# Patient Record
Sex: MALE | Race: BLACK OR AFRICAN AMERICAN | NOT HISPANIC OR LATINO | Employment: PART TIME | URBAN - METROPOLITAN AREA
[De-identification: names, ages, dates, MRNs, and addresses within clinical notes are randomized per-mention and may not be internally consistent; named-entity substitution may affect disease eponyms.]

---

## 2017-07-23 ENCOUNTER — HOSPITAL ENCOUNTER (EMERGENCY)
Facility: OTHER | Age: 27
Discharge: HOME | End: 2017-07-23
Attending: EMERGENCY MEDICINE
Payer: MEDICAID

## 2017-07-23 VITALS
HEART RATE: 70 BPM | HEIGHT: 67 IN | WEIGHT: 170 LBS | OXYGEN SATURATION: 99 % | TEMPERATURE: 99 F | DIASTOLIC BLOOD PRESSURE: 80 MMHG | BODY MASS INDEX: 26.68 KG/M2 | RESPIRATION RATE: 18 BRPM | SYSTOLIC BLOOD PRESSURE: 156 MMHG

## 2017-07-23 DIAGNOSIS — R19.7 DIARRHEA, UNSPECIFIED TYPE: Primary | ICD-10-CM

## 2017-07-23 PROCEDURE — 99283 EMERGENCY DEPT VISIT LOW MDM: CPT

## 2017-07-23 PROCEDURE — 25000003 PHARM REV CODE 250: Performed by: PHYSICIAN ASSISTANT

## 2017-07-23 RX ORDER — DICYCLOMINE HYDROCHLORIDE 20 MG/1
20 TABLET ORAL 2 TIMES DAILY
Qty: 20 TABLET | Refills: 0 | Status: SHIPPED | OUTPATIENT
Start: 2017-07-23 | End: 2017-08-22

## 2017-07-23 RX ADMIN — LIDOCAINE HYDROCHLORIDE: 20 SOLUTION ORAL; TOPICAL at 09:07

## 2017-07-24 NOTE — ED PROVIDER NOTES
Encounter Date: 7/23/2017       History     Chief Complaint   Patient presents with    Diarrhea     since last night has not taken anything for it     Patient is a 27-year-old male presenting to the emergency department with complaints of diarrhea.  The patient reports his symptoms started yesterday evening.  He states he is unable to quantify, times he has had diarrhea since.  He reports it is nonbloody in nature.  He denies associated nausea or vomiting.  He does report some generalized abdominal pain most significant in the epigastric region. He denies known sick contacts.  He reports he has had grits and water today. He denies previous episode, he denies taking any medication for his symptoms thus far.      The history is provided by the patient.     Review of patient's allergies indicates:  No Known Allergies  History reviewed. No pertinent past medical history.  History reviewed. No pertinent surgical history.  History reviewed. No pertinent family history.  Social History   Substance Use Topics    Smoking status: Never Smoker    Smokeless tobacco: Never Used    Alcohol use Yes      Comment: occasionally     Review of Systems   Constitutional: Negative for activity change, chills, fatigue and fever.   HENT: Negative for congestion, rhinorrhea and sore throat.    Eyes: Negative for photophobia and visual disturbance.   Respiratory: Negative for cough and shortness of breath.    Cardiovascular: Negative for chest pain.   Gastrointestinal: Positive for abdominal pain and diarrhea. Negative for nausea and vomiting.   Genitourinary: Negative for dysuria, hematuria and urgency.   Musculoskeletal: Negative for back pain, myalgias and neck pain.   Skin: Negative for color change and wound.   Neurological: Negative for weakness and headaches.   Psychiatric/Behavioral: Negative for agitation and confusion.       Physical Exam     Initial Vitals [07/23/17 2038]   BP Pulse Resp Temp SpO2   (!) 156/80 70 18 99 °F (37.2  °C) 99 %      MAP       105.33         Physical Exam    Nursing note and vitals reviewed.  Constitutional: He appears well-developed and well-nourished. He is not diaphoretic. He is cooperative.  Non-toxic appearance. He does not have a sickly appearance. He does not appear ill. No distress.   Well appearing, -American male, unaccompanied in the emergency department.  He is smiling on exam, is in no acute distress.  He ambulates without difficulty.    HENT:   Head: Normocephalic and atraumatic.   Right Ear: External ear normal.   Left Ear: External ear normal.   Nose: Nose normal.   Mouth/Throat: Oropharynx is clear and moist.   Eyes: Conjunctivae and EOM are normal.   Neck: Normal range of motion. Neck supple.   Cardiovascular: Normal rate, regular rhythm and normal heart sounds.   Pulmonary/Chest: Breath sounds normal. No respiratory distress. He has no wheezes.   Abdominal: Soft. Bowel sounds are normal. He exhibits no distension. There is no tenderness. There is no rebound and no guarding.   Musculoskeletal: Normal range of motion.   Neurological: He is alert and oriented to person, place, and time. GCS eye subscore is 4. GCS verbal subscore is 5. GCS motor subscore is 6.   Skin: Skin is warm.   Psychiatric: He has a normal mood and affect. His behavior is normal. Judgment and thought content normal.         ED Course   Procedures  Labs Reviewed - No data to display          Medical Decision Making:   Initial Assessment:   Urgent evaluation of a 27-year-old male presenting to the emergency department with complaints of diarrhea.  Patient is afebrile, nontoxic appearing, hemodynamically stable.  Physical exam reveals regular rate and rhythm, lungs are clear to auscultation bilaterally.  Abdomen is soft and nontender with normal bowel sounds. Moist mucous membranes.  ED Management:  At this time, I do not feel that further testing or imaging is warranted.  The patient does not have an acute abdomen.  He has  normal vital signs.  He is tolerating by mouth without difficulty.  He is given a GI cocktail in the emergency department.  He will be discharged home with a prescription for Bentyl and counseled on symptomatic care and treatment including a bland diet. The patient was instructed to follow up with a primary care provider in 2 days or to return to the emergency department for worsening symptoms. The treatment plan was discussed with the patient who demonstrated understanding and comfort with plan. The patient's history, physical exam, and plan of care was discussed with and agreed upon with my supervising physician.    Other:   I have discussed this case with another health care provider.       <> Summary of the Discussion: Dr. Sahni  This note was created using Dragon Medical Dictation. There may be typographical errors secondary to dictation.                    ED Course     Clinical Impression:     1. Diarrhea, unspecified type         Disposition:   Disposition: Discharged  Condition: Stable                        Ritu Lamb PA-C  07/23/17 8835

## 2017-07-24 NOTE — ED NOTES
Diarrhea with intermittent epigastric pain since yesterday at 2000. Unable to eat anything related nausea and abdominal pain. Denies vomiting, blood in stool, or SOB. Pain to epigastric region rated 8/10 pain. Pt AAOx4 and appropriate at this time. Respirations even and unlabored. No acute distress noted.

## 2018-08-08 ENCOUNTER — HOSPITAL ENCOUNTER (EMERGENCY)
Dept: HOSPITAL 62 - ER | Age: 28
Discharge: HOME | End: 2018-08-08
Payer: COMMERCIAL

## 2018-08-08 VITALS — DIASTOLIC BLOOD PRESSURE: 86 MMHG | SYSTOLIC BLOOD PRESSURE: 146 MMHG
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
## 2018-08-08 DIAGNOSIS — M79.89: ICD-10-CM
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
## 2018-08-08 DIAGNOSIS — R20.0: ICD-10-CM

## 2018-08-08 DIAGNOSIS — T75.4XXA: Primary | ICD-10-CM
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
## 2018-08-08 PROCEDURE — 93005 ELECTROCARDIOGRAM TRACING: CPT

## 2018-08-08 PROCEDURE — 93010 ELECTROCARDIOGRAM REPORT: CPT

## 2018-08-08 PROCEDURE — 96372 THER/PROPH/DIAG INJ SC/IM: CPT

## 2018-08-08 PROCEDURE — 99284 EMERGENCY DEPT VISIT MOD MDM: CPT

## 2018-08-08 NOTE — ER DOCUMENT REPORT
ED General





- General


Chief Complaint: Electrical Burn


Stated Complaint: ELECTRIC SHOCK TO HAND


Time Seen by Provider: 08/08/18 19:49


Notes: 





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 

 when he shocked himself with his left hand.  He stated that he had 

minor swelling on the back of his left hand approximately posterior to thenar 

eminence.  Patient has numbness in this area as well but has full flexion of 

extension of ab and abduction.  No known medical problems does not take 

medications a daily basis recently moved to the area does not have a primary 

care physician


TRAVEL OUTSIDE OF THE [place] IN LAST 30 DAYS: No





Past Medical History





- Social History


Smoking Status: Unknown if Ever Smoked


Family History: Reviewed & Not Pertinent





Review of Systems





- Review of Systems


Constitutional: No symptoms reported


EENT: No symptoms reported


Cardiovascular: No symptoms reported


Respiratory: No symptoms reported


Gastrointestinal: No symptoms reported


Genitourinary: No symptoms reported


Male Genitourinary: No symptoms reported


Musculoskeletal: See HPI


Skin: No symptoms reported


Hematologic/Lymphatic: No symptoms reported


Neurological/Psychological: No symptoms reported





Physical Exam





- Vital signs


Vitals: 


 











Temp Pulse Resp BP Pulse Ox


 


 98.4 F   68   18   146/86 H  98 


 


 08/08/18 19:33  08/08/18 19:33  08/08/18 19:33  08/08/18 19:33  08/08/18 19:33














- Cardiovascular


Rhythm: Regular


Heart sounds: Normal auscultation


Murmur: No





- Extremities


General upper extremity: Other - Dorsal aspect of left hand has small papule 

that is directly inferior to thenar eminence region and also has small 

discoloration over dorsal aspect over the left wrist.  No bogginess no skin 

discoloration elsewhere full flexion and extension ab and abduction of thumb 

but pain with doing so.  No signs of compartment syndrome.





Course





- Re-evaluation


Re-evalutation: 





08/08/18 20:25


Discussed case with Dr. Corona who will see patient for evaluation in the 

morning tomorrow.  No signs of compartment syndrome no signs of tendon 

involvement at this time Dr. Corona said he may consider MRI depending on exam.

  Patient is right-hand dominant





- Vital Signs


Vital signs: 


 











Temp Pulse Resp BP Pulse Ox


 


 98.4 F   68   18   146/86 H  98 


 


 08/08/18 19:33  08/08/18 19:33  08/08/18 19:33  08/08/18 19:33  08/08/18 19:33














- EKG Interpretation by Me


EKG shows normal: Sinus rhythm


Rate: Normal


Rhythm: NSR - normal axis





Discharge





- Discharge


Condition: Good


Disposition: HOME, SELF-CARE


Instructions:  Electrical Injury ([facility])


Prescriptions: 


Naproxen 500 mg PO BID #20 tablet


Referrals: 


LARRY CORONA MD [ACTIVE STAFF] - Follow up tomorrow

## 2020-02-14 ENCOUNTER — HOSPITAL ENCOUNTER (EMERGENCY)
Dept: HOSPITAL 62 - ER | Age: 30
Discharge: HOME | End: 2020-02-14
Payer: COMMERCIAL

## 2020-02-14 VITALS — DIASTOLIC BLOOD PRESSURE: 95 MMHG | SYSTOLIC BLOOD PRESSURE: 165 MMHG

## 2020-02-14 DIAGNOSIS — F17.200: ICD-10-CM

## 2020-02-14 DIAGNOSIS — H92.01: ICD-10-CM

## 2020-02-14 DIAGNOSIS — H60.501: Primary | ICD-10-CM

## 2020-02-14 PROCEDURE — 99282 EMERGENCY DEPT VISIT SF MDM: CPT

## 2020-02-14 NOTE — ER DOCUMENT REPORT
HPI





- HPI


Time Seen by Provider: 02/14/20 15:07


Notes: 





Patient is a 30-year-old male no significant past medical history presents 

complaint of right ear pain that began last night.  Patient does use Q-tips.  He

has been otherwise able to eat and drink without difficulty.  He is urinating 

normally and having normal bowel movements.  Denies drug allergies.  No other 

concerns or complaints.  No recent illness.  Denies any headache, fever, neck 

pain, changes in vision/speech/mentation/hearing, URI, sore throat, chest pain, 

palpitations, syncope, cough, shortness of breath, wheeze, dyspnea, abdominal 

pain, nausea/vomiting/diarrhea, urinary retention, dysuria, hematuria, tinnitus,

dizziness, or rash.





- ROS


Systems Reviewed and Negative: Yes All other systems reviewed and negative





Past Medical History





- Social History


Smoking Status: Current Every Day Smoker


Family History: Reviewed & Not Pertinent


Renal/ Medical History: Denies: Hx Peritoneal Dialysis





Vertical Provider Document





- CONSTITUTIONAL


Agree With Documented VS: Yes


Notes: 





PHYSICAL EXAMINATION:





GENERAL: Well-appearing, well-nourished and in no acute distress.  A&Ox4.  

Answers questions appropriately.  Moves comfortably w/o notable distress





HEAD: Atraumatic, normocephalic.





EYES: Pupils equal round and reactive to light, extraocular movements intact, 

sclera anicteric, conjunctiva are normal.





ENT: Rt EAC tender, scant discharge w/o significant swelling/occlusion.  Lt EAC 

wnl. + Tenderness to tragus rt.  No mastoid tenderness bilaterally.  TM's intact

b/l without erythema, fluid, or perforation.  Nares patent and without 

discharge.  oropharynx no erythema without exudates.  No tonsilar hypertrophy 

without erythema or exudate.  No palatine shift.  Uvula midline.  No tongue 

protrusion.  No drooling, hoarseness, or airway compromise.  Moist mucous 

membranes.  No sinus tenderness.





NECK: Normal range of motion, supple without lymphadenopathy.  No 

rigidity/meningismus.





LUNGS: Breath sounds clear to auscultation bilaterally and equal.  No wheezes 

rales or rhonchi.  No retractions





HEART: Regular rate and rhythm without murmurs, rubs, gallops.





NEUROLOGICAL: Normal speech, normal gait.  





PSYCH: Normal mood, normal affect.





SKIN: Warm, Dry, normal turgor, no rashes or lesions noted.





- INFECTION CONTROL


TRAVEL OUTSIDE OF THE [place] IN LAST 30 DAYS: No





Course





- Re-evaluation


Re-evalutation: 





02/14/20 15:13


Patient is an afebrile, well-hydrated, 30-year-old male who presents with acute 

otitis externa of the right ear.  Vitals are acceptable without significant 

tachycardia, tachypnea, or hypoxia.  PE is otherwise unremarkable.  Patient is 

nontoxic-appearing and is tolerating p.o. without difficulty.  No ear wick 

warranted at this time.  No further work-up warranted.  Low suspicion for any 

sepsis, meningitis, severe dehydration, respiratory compromise, mastoiditis, or 

other systemic emergent condition at this time.  Patient is aware that condition

can change from initial presentation and he needs to monitor symptoms closely 

and seek medical attention with any acute changes. Rx for ciprodex. Recheck with

your PCM in 3 to 5 days.  Consider consult with ENT.  Return to the ED with any 

other worsening/concerning symptoms.  Patient is in agreement.





- Vital Signs


Vital signs: 


                                        











Temp Pulse Resp BP Pulse Ox


 


 98.5 F   69   18   165/95 H  99 


 


 02/14/20 14:59  02/14/20 14:59  02/14/20 14:59  02/14/20 14:59  02/14/20 14:59














Discharge





- Discharge


Clinical Impression: 


 Right ear pain





Acute otitis externa of right ear


Qualifiers:


 Otitis externa type: unspecified type Qualified Code(s): H60.501 - Unspecified 

acute noninfective otitis externa, right ear





Condition: Stable


Disposition: HOME, SELF-CARE


Instructions:  Otitis Externa ([facility])


Additional Instructions: 


Maintain adequate fluid intake


Take meds as directed


tylenol/ibuprofen as needed


Avoid Q-tips in the ears


over the counter cold medication as needed for symptoms


F/u:  with your PCM in 3-5 days for a recheck


Consider consult with ENT





Return to the ED with any fever, dizziness, tinnitus, headaches, worsening pain,

chest pain, palpitations, syncope, neck pain/stiffness, shortness of breath, 

wheezing, drooling, trouble swallowing/breathing, abdominal pain, n/v/d, rash, 

or worsening/concerning symptoms otherwise.


Prescriptions: 


Ciprofloxacin HCl/Dexameth [Ciprodex Otic Suspension 7.5 ml Bottle] 4 drop OT 

BID #1 bottle


Forms:  Elevated Blood Pressure, Smoking Cessation Education


Referrals: 


JOSE DODSON DO [ASSOCIATE] - Follow up as needed

## 2020-02-26 ENCOUNTER — HOSPITAL ENCOUNTER (EMERGENCY)
Dept: HOSPITAL 62 - ER | Age: 30
Discharge: HOME | End: 2020-02-26
Payer: COMMERCIAL

## 2020-02-26 VITALS — SYSTOLIC BLOOD PRESSURE: 126 MMHG | DIASTOLIC BLOOD PRESSURE: 80 MMHG

## 2020-02-26 DIAGNOSIS — R51: Primary | ICD-10-CM

## 2020-02-26 DIAGNOSIS — H53.8: ICD-10-CM

## 2020-02-26 DIAGNOSIS — F17.200: ICD-10-CM

## 2020-02-26 LAB
ADD MANUAL DIFF: NO
ALBUMIN SERPL-MCNC: 3.7 G/DL (ref 3.5–5)
ALP SERPL-CCNC: 59 U/L (ref 38–126)
ANION GAP SERPL CALC-SCNC: 8 MMOL/L (ref 5–19)
APPEARANCE ALL TUBES: (no result)
APPEARANCE ALL TUBES: (no result)
AST SERPL-CCNC: 25 U/L (ref 17–59)
BASOPHILS # BLD AUTO: 0.1 10^3/UL (ref 0–0.2)
BASOPHILS NFR BLD AUTO: 0.8 % (ref 0–2)
BILIRUB DIRECT SERPL-MCNC: 0.2 MG/DL (ref 0–0.4)
BILIRUB SERPL-MCNC: 0.3 MG/DL (ref 0.2–1.3)
BUN SERPL-MCNC: 11 MG/DL (ref 7–20)
CALCIUM: 9.3 MG/DL (ref 8.4–10.2)
CHLORIDE SERPL-SCNC: 105 MMOL/L (ref 98–107)
CO2 SERPL-SCNC: 27 MMOL/L (ref 22–30)
COLOR TUBE 1: (no result)
COLOR TUBE 1: (no result)
COLOR TUBE 2: (no result)
COLOR TUBE 2: (no result)
COLOR TUBE 3: (no result)
COLOR TUBE 3: (no result)
COLOR TUBE 4: (no result)
COLOR TUBE 4: (no result)
CSF TOTAL VOLUME: 5 CC
CSF TOTAL VOLUME: 5 CC
CSF TUBE NUMBER: 1
CSF TUBE NUMBER: 4
EOSINOPHIL # BLD AUTO: 0 10^3/UL (ref 0–0.6)
EOSINOPHIL NFR BLD AUTO: 0.2 % (ref 0–6)
ERYTHROCYTE [DISTWIDTH] IN BLOOD BY AUTOMATED COUNT: 13.4 % (ref 11.5–14)
GLUCOSE CSF-MCNC: 67 MG/DL (ref 40–70)
GLUCOSE SERPL-MCNC: 110 MG/DL (ref 75–110)
HCT VFR BLD CALC: 43 % (ref 37.9–51)
HGB BLD-MCNC: 15 G/DL (ref 13.5–17)
LYMPHOCYTES # BLD AUTO: 1.5 10^3/UL (ref 0.5–4.7)
LYMPHOCYTES NFR BLD AUTO: 20.4 % (ref 13–45)
MCH RBC QN AUTO: 28.3 PG (ref 27–33.4)
MCHC RBC AUTO-ENTMCNC: 34.9 G/DL (ref 32–36)
MCV RBC AUTO: 81 FL (ref 80–97)
MONOCYTES # BLD AUTO: 0.4 10^3/UL (ref 0.1–1.4)
MONOCYTES NFR BLD AUTO: 4.8 % (ref 3–13)
MONONUCLEAR CELLS CSF: 17 %
NEUTROPHILS # BLD AUTO: 5.6 10^3/UL (ref 1.7–8.2)
NEUTS SEG NFR BLD AUTO: 73.8 % (ref 42–78)
PLATELET # BLD: 167 10^3/UL (ref 150–450)
POLYMORPHONUCLEAR CELLS CSF: 83 %
POTASSIUM SERPL-SCNC: 4.1 MMOL/L (ref 3.6–5)
PROT SERPL-MCNC: 6.4 G/DL (ref 6.3–8.2)
PROTEIN,CSF: 50 MG/DL (ref 12–60)
RBC # BLD AUTO: 5.31 10^6/UL (ref 4.35–5.55)
TOTAL CELLS COUNTED % (AUTO): 100 %
VOLUME TUBE 1: 1 CC
VOLUME TUBE 1: 1 CC
VOLUME TUBE 2: 1 CC
VOLUME TUBE 2: 1 CC
VOLUME TUBE 3: 1 CC
VOLUME TUBE 3: 1 CC
VOLUME TUBE 4: 2 CC
VOLUME TUBE 4: 2 CC
WBC # BLD AUTO: 7.6 10^3/UL (ref 4–10.5)

## 2020-02-26 PROCEDURE — 99284 EMERGENCY DEPT VISIT MOD MDM: CPT

## 2020-02-26 PROCEDURE — 87205 SMEAR GRAM STAIN: CPT

## 2020-02-26 PROCEDURE — 36415 COLL VENOUS BLD VENIPUNCTURE: CPT

## 2020-02-26 PROCEDURE — 89050 BODY FLUID CELL COUNT: CPT

## 2020-02-26 PROCEDURE — 70498 CT ANGIOGRAPHY NECK: CPT

## 2020-02-26 PROCEDURE — 84157 ASSAY OF PROTEIN OTHER: CPT

## 2020-02-26 PROCEDURE — 70450 CT HEAD/BRAIN W/O DYE: CPT

## 2020-02-26 PROCEDURE — 80053 COMPREHEN METABOLIC PANEL: CPT

## 2020-02-26 PROCEDURE — 87070 CULTURE OTHR SPECIMN AEROBIC: CPT

## 2020-02-26 PROCEDURE — 00JU3ZZ INSPECTION OF SPINAL CANAL, PERCUTANEOUS APPROACH: ICD-10-PCS | Performed by: EMERGENCY MEDICINE

## 2020-02-26 PROCEDURE — 82945 GLUCOSE OTHER FLUID: CPT

## 2020-02-26 PROCEDURE — 70496 CT ANGIOGRAPHY HEAD: CPT

## 2020-02-26 PROCEDURE — 85025 COMPLETE CBC W/AUTO DIFF WBC: CPT

## 2020-02-26 NOTE — RADIOLOGY REPORT (SQ)
EXAM DESCRIPTION:  CTA HEAD



COMPLETED DATE/TIME:  2/26/2020 2:13 pm



REASON FOR STUDY:  right sided headache



COMPARISON:  None.



TECHNIQUE:  Post IV contrast scanning, thin section axial imaging through the brain to evaluate the a
rterial structures.  Source and MIP images are saved and reviewed on PACS.

Advanced 3D imaging as volume-rendering, MIPs, SSD performed? yes

All CT scanners at this facility use dose modulation, iterative reconstruction, and/or weight based d
osing when appropriate to reduce radiation dose to as low as reasonably achievable (ALARA).

[facility]: Dose Right  [facility]: CareDose    [facility]: Dose Right    [facility]: Teradose 4D    [facility]: Smart Technologies



CONTRAST TYPE AND DOSE:  75 mL Omnipaque 350



RENAL FUNCTION:  BUN 11, creatinine 0.79



LIMITATIONS:  None.



FINDINGS:  [place]: The anterior, middle, posterior cerebral arteries are all patent.  No ev
idence of aneurysm or focal stenosis.

POSTERIOR CIRCULATION: The distal vertebral arteries are patent as is the basilar artery. No aneurysm
.

BRAIN: No gross enhancing lesions as visualized.  The superior cerebral hemispheres are not included 
in the field of view.

BONES: Intact as visualized.

SINUSES: No fluid or mucosal thickening.

OTHER: No other significant finding.



IMPRESSION:  NO CTA EVIDENCE OF STENOSIS OR ANEURYSM OF THE [place].



TECHNICAL DOCUMENTATION:  JOB ID:  [id number]

Quality ID # 436: Final reports with documentation of one or more dose reduction techniques (e.g., Au
tomated exposure control, adjustment of the mA and/or kV according to patient size, use of iterative 
reconstruction technique)

 2011 [employer]- All Rights Reserved



Reading location - IP/workstation name: [email or web address]

## 2020-02-26 NOTE — RADIOLOGY REPORT (SQ)
EXAM DESCRIPTION:  CTA NECK



COMPLETED DATE/TIME:  2/26/2020 2:14 pm



REASON FOR STUDY:  right sided headache



COMPARISON:  None.



TECHNIQUE:  Axial dynamic scanning technique with  dynamic contrast enhancement through the extra-cra
nial carotid and vertebral  arteries.  Multiplanar reconstruction.  3-D MIPS and Volume-rendered imag
es  acquired at the workstation and saved to PACS.  Images are reviewed in soft  tissue, bone, lung w
indows.

All CT scanners at this facility use dose modulation, iterative reconstruction, and/or weight based d
osing when appropriate to reduce radiation dose to as low as reasonably achievable (ALARA).

[facility]: Dose Right  [facility]: CareDose    [facility]: Dose Right    [facility]: Teradose 4D    [facility]: [employer]



CONTRAST TYPE AND DOSE:  75 mL Omnipaque 350



RENAL FUNCTION:  BUN 11, creatinine 0.79



LIMITATIONS:  None.



FINDINGS:  AORTIC ARCH: Normal three-vessel origin.  Bilateral subclavian arteries are patent.  No  d
issection.

RIGHT CAROTIDS: Patent common, internal and external carotid arteries without suggestion of significa
nt stenosis or irregular plaque.  No dissection.

RIGHT VERTEBRAL: Patent.  No dissection.

LEFT CAROTIDS: Patent common, internal and external carotid arteries without suggestion of significan
t stenosis or irregular plaque.  No dissection.

LEFT VERTEBRAL: Patent.  No dissection.

OTHER: No other significant finding.

OTHER: 3-D  reconstructions confirm findings.



IMPRESSION:  NORMAL CTA OF THE EXTRA-CRANIAL CAROTID AND VERTEBRAL ARTERIES.



COMMENT:  Quality ID #195: Measurements of distal internal carotid diameter were used as the denomina
tor for stenosis measurement.



TECHNICAL DOCUMENTATION:  JOB ID:  [id number]

Quality ID # 436: Final reports with documentation of one or more dose reduction techniques (e.g., Au
tomated exposure control, adjustment of the mA and/or kV according to patient size, use of iterative 
reconstruction technique)

 2011 [employer]- All Rights Reserved



Reading location - IP/workstation name: [email or web address]

## 2020-02-26 NOTE — ER DOCUMENT REPORT
ED General





- General


Chief Complaint: Headache, Worst Ever


Stated Complaint: HEADACHE,FACIAL PAIN


Time Seen by Provider: 02/26/20 06:27


Primary Care Provider: 


[facility] [Provider Group] - Follow up as needed


Mode of Arrival: Ambulatory


Information source: Patient


TRAVEL OUTSIDE OF THE [place] IN LAST 30 DAYS: No





- HPI


Notes: 





Patient states that last night he started to have a severe headache on the right

side of his head.  States it started suddenly.  States he goes from the right 

temple to the right ear.  He denies any sinus congestion cough or cold.  No 

rashes.  No trauma.  He states that his vision has been slightly blurry.  No 

trouble swallowing or speaking.  He states he has had no nausea or vomiting.  He

states the pain is severe and throbbing.  Is located only on the right side of 

his head and radiates from the top to the ear.  It is constant.  Nothing makes 

it better or worse.





- Related Data


Allergies/Adverse Reactions: 


                                        





No Known Allergies Allergy (Verified 02/14/20 15:06)


   











Past Medical History





- General


Information source: Patient





- Social History


Smoking Status: Current Every Day Smoker


Chew tobacco use (# tins/day): No


Frequency of alcohol use: None


Drug Abuse: None


Family History: Reviewed & Not Pertinent


Patient has suicidal ideation: No


Patient has homicidal ideation: No


Renal/ Medical History: Denies: Hx Peritoneal Dialysis





Review of Systems





- Review of Systems


Constitutional: denies: Chills, Fever


Cardiovascular: denies: Chest pain, Palpitations


Respiratory: denies: Cough, Short of breath


-: Yes All other systems reviewed and negative





Physical Exam





- Vital signs


Vitals: 


                                        











Temp Pulse Resp BP Pulse Ox


 


 97.9 F   64   16   151/94 H  100 


 


 02/26/20 10:58  02/26/20 10:58  02/26/20 10:58  02/26/20 10:58  02/26/20 10:58











Interpretation: Normal





- General


General appearance: Appears well, Alert





- HEENT


Head: Normocephalic, Atraumatic


Eyes: Normal


Pupils: PERRL


Ears: Normal


External canal: Normal


Tympanic membrane: Normal


Mouth/Lips: Normal


Mucous membranes: Moist





- Respiratory


Respiratory status: No respiratory distress


Chest status: Nontender


Breath sounds: Normal


Chest palpation: Normal





- Cardiovascular


Rhythm: Regular


Heart sounds: Normal auscultation


Murmur: No





- Abdominal


Inspection: Normal


Distension: No distension


Bowel sounds: Normal


Tenderness: Nontender


Organomegaly: No organomegaly





- Back


Back: Normal, Nontender





- Extremities


General upper extremity: Normal inspection, Nontender, Normal color, Normal ROM,

Normal temperature


General lower extremity: Normal inspection, Nontender, Normal color, Normal ROM,

Normal temperature, Normal weight bearing.  No: Paulette's sign





- Neurological


Neuro grossly intact: Yes


Cognition: Normal


Orientation: AAOx4


Alicia Coma Scale Eye Opening: Spontaneous


Alicia Coma Scale Verbal: Oriented


Alicia Coma Scale Motor: Obeys Commands


Ambrose Coma Scale Total: 15


Speech: Normal


Cranial nerves: Normal


Cerebellar coordination: Normal


Motor strength normal: LUE, RUE, LLE, RLE


Additional motor exam normals: Equal .  No: Pronator drift


Sensory: Normal





- Psychological


Associated symptoms: Normal affect, Normal mood





- Skin


Skin Temperature: Warm


Skin Moisture: Dry


Skin Color: Normal





Course





- Re-evaluation


Re-evalutation: 





02/26/20 14:42


Patient presented with a right-sided headache that he states was worst of his 

life.  CAT scan was unremarkable.  Patient had an LP which had a tap consistent 

with a traumatic tap not with a hemorrhage.  Patient also had a CTA which showed

no evidence of any vascular irregularities.  Patient has a normal neurological 

exam.





- Vital Signs


Vital signs: 


                                        











Temp Pulse Resp BP Pulse Ox


 


 98.5 F   60   14   126/73 H  100 


 


 02/26/20 12:39  02/26/20 12:39  02/26/20 12:39  02/26/20 12:39  02/26/20 12:39














- Laboratory


Result Diagrams: 


                                 02/26/20 08:05





                                 02/26/20 08:05


Laboratory results interpreted by me: 


                                        











  02/26/20 02/26/20





  10:00 10:00


 


CSF WBC   17 H


 


CSF RBC  5300 H  28539 H














- Diagnostic Test


Radiology reviewed: Image reviewed, Reports reviewed





Procedures





- Lumbar Puncture


  ** Lumbar puncture


Time completed: 10:01


Consent obtained: Yes


Lumbar puncture pre-procedure: Sterile PPE donned, Betadine prep applied, 

Sterile drapes applied


Patient position: Lying


Needle size: 21


Lumbar puncture location: l3-l4


Anesthetic type: 1% Lidocaine


mL's of anesthetic: 5


Amount/type of drainage: 5cc's


Number of attempts: 1


Complications: Yes - bloody tap





Discharge





- Discharge


Clinical Impression: 


Headache


Qualifiers:


 Headache type: unspecified Headache chronicity pattern: acute headache 

Intractability: intractable Qualified Code(s): R51 - Headache





Condition: Stable


Disposition: HOME, SELF-CARE


Instructions:  Headache ([facility]), Post Lumbar Puncture ([facility])


Additional Instructions: 


Please call [place] [facility] as soon as possible to arrange follow-up


Prescriptions: 


Hydrocodone/Acetaminophen [Norco 5-325 mg Tablet] 1 tab PO Q6 PRN 3 Days #12 

tablet


 PRN Reason: 


Forms:  Return to Work


Referrals: 


[facility] [Provider Group] - Follow up as needed

## 2020-02-26 NOTE — RADIOLOGY REPORT (SQ)
EXAM DESCRIPTION: 



CT HEAD WITHOUT IV CONTRAST



COMPLETED DATE/TME:  02/26/2020 06:52



CLINICAL HISTORY: 



30 years, Male, right sided headache



COMPARISON:

None.



TECHNIQUE:

Axial CT images of the brain were obtained without contrast.

Sagittal and coronal reformats were performed. [facility]  Images

stored on PACS.

 

All CT scanners at this facility use dose modulation, iterative

reconstruction, and/or weight based dosing when appropriate to

reduce radiation dose to as low as reasonably achievable (ALARA).





[facility]: Dose Right [facility]: CareDose   [facility]: Dose Right    [facility]:

Teradose 4D    [facility]: [employer]



LIMITATIONS:

None.



FINDINGS:



There is no acute cortical infarct, hemorrhage, mass, edema,

hydrocephalus, or extra-axial fluid collection. The gray-white

matter differentiation is preserved. A mucus retention cyst is in

the left maxillary sinus. There is mucosal thickening of the

right maxillary sinus. The mastoid air cells are clear. There is

no acute fracture.





IMPRESSION:



No acute intracranial abnormality

 

TECHNICAL DOCUMENTATION:



Quality ID # 436: Final reports with documentation of one or more

dose reduction techniques (e.g., Automated exposure control,

adjustment of the mA and/or kV according to patient size, use of

iterative reconstruction technique)



copyright 2011 [employer]- All Rights Reserved

## 2020-03-01 ENCOUNTER — HOSPITAL ENCOUNTER (EMERGENCY)
Dept: HOSPITAL 62 - ER | Age: 30
LOS: 1 days | Discharge: HOME | End: 2020-03-02
Payer: COMMERCIAL

## 2020-03-01 DIAGNOSIS — F17.200: ICD-10-CM

## 2020-03-01 DIAGNOSIS — R51: Primary | ICD-10-CM

## 2020-03-01 PROCEDURE — 96374 THER/PROPH/DIAG INJ IV PUSH: CPT

## 2020-03-01 PROCEDURE — 96361 HYDRATE IV INFUSION ADD-ON: CPT

## 2020-03-01 PROCEDURE — 99283 EMERGENCY DEPT VISIT LOW MDM: CPT

## 2020-03-01 PROCEDURE — 96375 TX/PRO/DX INJ NEW DRUG ADDON: CPT

## 2020-03-02 VITALS — SYSTOLIC BLOOD PRESSURE: 132 MMHG | DIASTOLIC BLOOD PRESSURE: 77 MMHG

## 2020-03-02 NOTE — ER DOCUMENT REPORT
ED General





- General


Chief Complaint: Headache


Stated Complaint: HEADACHE


Time Seen by Provider: 03/02/20 02:08


TRAVEL OUTSIDE OF THE [place] IN LAST 30 DAYS: No





- HPI


Notes: 





Patient is a 30-year-old male who presents the emergency department for 

evaluation of a headache.  He was actually seen here earlier in the week for 

sudden onset right-sided headache on Wednesday.  He was seen by another ED 

provider, had CT of the head, CTA of the head and neck, lumbar puncture 

performed.  These were all found to be unremarkable.  He states his pain is 

still present.  He was sent home with Vicodin which he states helps for a short 

amount of time that his pain comes right back.  He does state that at times 

light makes his pain worse, nothing seems to make it significantly better.  He 

currently rates his pain at a 9 out of 10.  No fevers or chills.  No visual 

changes.  He is speaking and swallowing without difficulty.  No sore throats.  

No rashes.





- Related Data


Allergies/Adverse Reactions: 


                                        





No Known Allergies Allergy (Verified 02/14/20 15:06)


   








Home Medications: hydrocodone for headaches





Past Medical History





- General


Information source: Patient





- Social History


Smoking Status: Current Every Day Smoker


Family History: Reviewed & Not Pertinent


Patient has suicidal ideation: No


Patient has homicidal ideation: No


Renal/ Medical History: Denies: Hx Peritoneal Dialysis





Review of Systems





- Review of Systems


Neurological/Psychological: See HPI


-: Yes All other systems reviewed and negative





Physical Exam





- Vital signs


Vitals: 





                                        











Temp Pulse Resp BP Pulse Ox


 


 98.2 F   66   16   145/82 H  99 


 


 03/01/20 22:37  03/01/20 22:37  03/01/20 22:37  03/01/20 22:37  03/01/20 22:37














- Notes


Notes: 





Vital signs reviewed, please refer to chart. Head is normocephalic, atraumatic. 

Pupils equal round, reactive to light.  Oral mucosa is moist.  Uvula is midline.

 Neck is supple without meningismus.  He does have some tenderness at the base 

of the occiput on the right with associated spasm.  Heart is regular rate and 

rhythm.  Lungs are clear to auscultation bilaterally.  Abdomen is soft, 

nontender, normoactive bowel sounds throughout.  Extremities without cyanosis, 

clubbing. Posterior calves are nontender.  Peripheral pulses are equal.  Skin is

warm and dry.  Patient is awake, alert, oriented x3.  Cranial nerves II - XII 

are grossly intact without focal neurological deficits.  Strength is plus 5 out 

of 5 bilateral upper and lower extremities.  Sensation is intact.  Reflexes 

symmetrical.  Intact finger-nose-finger, rapid alternating movements, 

heel-to-shin.





Course





- Re-evaluation


Re-evalutation: 





03/02/20 02:23


Patient presents to the emergency department for evaluation.  He had a very 

thorough evaluation and work-up when he was here a few days ago.  His pain is 

been coming and going.  He has no nuchal rigidity, no other signs of a spinal 

headache or other more significant cause.  Again treat the patient with a 

migraine cocktail and have him follow-up with [facility].  He is to return to

the ED with worsening or new concerning symptoms of any sort.





- Vital Signs


Vital signs: 





                                        











Temp Pulse Resp BP Pulse Ox


 


 98.2 F   66   16   145/82 H  99 


 


 03/01/20 22:37  03/01/20 22:37  03/01/20 22:37  03/01/20 22:37  03/01/20 22:37














Discharge





- Discharge


Clinical Impression: 


Headache


Qualifiers:


 Headache chronicity pattern: acute headache 





Condition: Stable


Disposition: HOME, SELF-CARE


Instructions:  Toradol Injection ([facility]), Headache ([facility])


Additional Instructions: 


Rest, stay well-hydrated.  Follow-up with [facility] this week.  Return to 

the emergency department with worsening or new concerning symptoms of any sort.